# Patient Record
Sex: MALE
[De-identification: names, ages, dates, MRNs, and addresses within clinical notes are randomized per-mention and may not be internally consistent; named-entity substitution may affect disease eponyms.]

---

## 2023-02-13 ENCOUNTER — NON-APPOINTMENT (OUTPATIENT)
Age: 38
End: 2023-02-13

## 2023-03-06 ENCOUNTER — APPOINTMENT (OUTPATIENT)
Dept: SURGERY | Facility: CLINIC | Age: 38
End: 2023-03-06
Payer: MEDICAID

## 2023-03-06 VITALS
SYSTOLIC BLOOD PRESSURE: 125 MMHG | WEIGHT: 176.37 LBS | BODY MASS INDEX: 27.04 KG/M2 | HEART RATE: 72 BPM | DIASTOLIC BLOOD PRESSURE: 75 MMHG | HEIGHT: 67.72 IN

## 2023-03-06 DIAGNOSIS — Z82.49 FAMILY HISTORY OF ISCHEMIC HEART DISEASE AND OTHER DISEASES OF THE CIRCULATORY SYSTEM: ICD-10-CM

## 2023-03-06 DIAGNOSIS — F12.90 CANNABIS USE, UNSPECIFIED, UNCOMPLICATED: ICD-10-CM

## 2023-03-06 DIAGNOSIS — Z83.49 FAMILY HISTORY OF OTHER ENDOCRINE, NUTRITIONAL AND METABOLIC DISEASES: ICD-10-CM

## 2023-03-06 DIAGNOSIS — T14.8XXA OTHER INJURY OF UNSPECIFIED BODY REGION, INITIAL ENCOUNTER: ICD-10-CM

## 2023-03-06 PROBLEM — Z00.00 ENCOUNTER FOR PREVENTIVE HEALTH EXAMINATION: Status: ACTIVE | Noted: 2023-03-06

## 2023-03-06 PROCEDURE — 99203 OFFICE O/P NEW LOW 30 MIN: CPT

## 2023-03-09 PROBLEM — Z83.49 FAMILY HISTORY OF THYROID DISEASE: Status: ACTIVE | Noted: 2023-03-06

## 2023-03-09 PROBLEM — F12.90 OCCASIONAL USE OF MARIJUANA: Status: ACTIVE | Noted: 2023-03-06

## 2023-03-09 PROBLEM — Z82.49 FAMILY HISTORY OF CARDIAC DISORDER: Status: ACTIVE | Noted: 2023-03-06

## 2023-03-09 RX ORDER — FLUTICASONE PROPIONATE 50 UG/1
50 SPRAY, METERED NASAL
Qty: 16 | Refills: 0 | Status: ACTIVE | COMMUNITY
Start: 2023-03-06

## 2023-03-09 RX ORDER — BENZONATATE 100 MG/1
100 CAPSULE ORAL
Qty: 30 | Refills: 0 | Status: ACTIVE | COMMUNITY
Start: 2023-03-06

## 2023-03-14 PROBLEM — T14.8XXA FOREIGN MATERIAL: Status: ACTIVE | Noted: 2023-03-14

## 2023-03-14 NOTE — PLAN
[FreeTextEntry1] : Mr. CRAVEN  was told significance of findings, options, risks and benefits were explained.  Informed consent for wound exploration  and removal of foreign body and potential risks, benefits and alternatives (surgical options were discussed including non-surgical options or the option of no surgery) to the planned surgery were discussed in depth.  All surgical options were discussed including non-surgical treatments.  He wishes to proceed with surgery.  We will plan for surgery on at the next available date, pending any required insurance pre-certification or pre-approval. He agrees to obtain any necessary pre-operative evaluations and testing prior to surgery.\par Patient advised to seek immediate medical attention with any acute change in symptoms or with the development of any new or worsening symptoms.  Patient's questions and concerns addressed to patient's satisfaction, and patient verbalized an understanding of the information discussed.\par \par

## 2023-03-14 NOTE — PHYSICAL EXAM
[Abdominal Masses] : No abdominal masses [Abdomen Tenderness] : ~T ~M No abdominal tenderness [Alert] : alert [Oriented to Person] : oriented to person [Oriented to Place] : oriented to place [Oriented to Time] : oriented to time [Calm] : calm [de-identified] : He  is alert, well-groomed, and in NAD\par   [de-identified] : anicteric.  Nasal mucosa pink, septum midline. Oral mucosa pink.  Tongue midline, Pharynx without exudates.\par   [de-identified] : Neck supple. Trachea midline. Thyroid isthmus barely palpable, lobes not felt.\par   [de-identified] : RLQ scar

## 2023-03-14 NOTE — CONSULT LETTER
[Dear  ___] : Dear  [unfilled], [Consult Letter:] : I had the pleasure of evaluating your patient, [unfilled]. [Please see my note below.] : Please see my note below. [Consult Closing:] : Thank you very much for allowing me to participate in the care of this patient.  If you have any questions, please do not hesitate to contact me. [Sincerely,] : Sincerely, [FreeTextEntry3] : Yung Gonzalez MD, FACS

## 2023-03-14 NOTE — HISTORY OF PRESENT ILLNESS
[de-identified] : Mr. HOWARD CRAVEN  is  a 37 year   old patient  who was referred by Dr. Shelley Gilbert with the chief complaint of having pain  in his  right  lower quadrant. He has had the condition for 2 years .  Mr. CRAVEN is reporting  history of  appendectomy. He denies any fever or  night sweats.  Appetite is good and weight is stable. \par

## 2023-03-30 ENCOUNTER — TRANSCRIPTION ENCOUNTER (OUTPATIENT)
Age: 38
End: 2023-03-30

## 2023-03-31 ENCOUNTER — APPOINTMENT (OUTPATIENT)
Dept: SURGERY | Facility: HOSPITAL | Age: 38
End: 2023-03-31

## 2023-03-31 ENCOUNTER — TRANSCRIPTION ENCOUNTER (OUTPATIENT)
Age: 38
End: 2023-03-31

## 2023-03-31 ENCOUNTER — OUTPATIENT (OUTPATIENT)
Dept: OUTPATIENT SERVICES | Facility: HOSPITAL | Age: 38
LOS: 1 days | End: 2023-03-31
Payer: MEDICAID

## 2023-03-31 ENCOUNTER — RESULT REVIEW (OUTPATIENT)
Age: 38
End: 2023-03-31

## 2023-03-31 VITALS
TEMPERATURE: 98 F | DIASTOLIC BLOOD PRESSURE: 68 MMHG | RESPIRATION RATE: 18 BRPM | OXYGEN SATURATION: 100 % | SYSTOLIC BLOOD PRESSURE: 113 MMHG | HEART RATE: 66 BPM

## 2023-03-31 VITALS
HEART RATE: 70 BPM | DIASTOLIC BLOOD PRESSURE: 79 MMHG | OXYGEN SATURATION: 99 % | TEMPERATURE: 98 F | WEIGHT: 176.37 LBS | RESPIRATION RATE: 18 BRPM | HEIGHT: 67 IN | SYSTOLIC BLOOD PRESSURE: 123 MMHG

## 2023-03-31 DIAGNOSIS — R10.31 RIGHT LOWER QUADRANT PAIN: ICD-10-CM

## 2023-03-31 DIAGNOSIS — Z98.890 OTHER SPECIFIED POSTPROCEDURAL STATES: Chronic | ICD-10-CM

## 2023-03-31 DIAGNOSIS — Z90.49 ACQUIRED ABSENCE OF OTHER SPECIFIED PARTS OF DIGESTIVE TRACT: Chronic | ICD-10-CM

## 2023-03-31 PROCEDURE — 10120 INC&RMVL FB SUBQ TISS SMPL: CPT

## 2023-03-31 PROCEDURE — 20520 RMVL FB MUSC/TDN SIMPLE: CPT

## 2023-03-31 PROCEDURE — 88305 TISSUE EXAM BY PATHOLOGIST: CPT

## 2023-03-31 PROCEDURE — 88305 TISSUE EXAM BY PATHOLOGIST: CPT | Mod: 26

## 2023-03-31 PROCEDURE — 88304 TISSUE EXAM BY PATHOLOGIST: CPT

## 2023-03-31 PROCEDURE — 88304 TISSUE EXAM BY PATHOLOGIST: CPT | Mod: 26

## 2023-03-31 PROCEDURE — ZZZZZ: CPT

## 2023-03-31 RX ORDER — SODIUM CHLORIDE 9 MG/ML
3 INJECTION INTRAMUSCULAR; INTRAVENOUS; SUBCUTANEOUS EVERY 8 HOURS
Refills: 0 | Status: DISCONTINUED | OUTPATIENT
Start: 2023-03-31 | End: 2023-03-31

## 2023-03-31 RX ORDER — SODIUM CHLORIDE 9 MG/ML
1000 INJECTION, SOLUTION INTRAVENOUS
Refills: 0 | Status: DISCONTINUED | OUTPATIENT
Start: 2023-03-31 | End: 2023-04-14

## 2023-03-31 NOTE — H&P PST ADULT - HISTORY OF PRESENT ILLNESS
36 y/o Male w/ no sig PMHx, PSHx of Appendectomy, Varicose Vein presented for elective removal of foreign body; Pt states ever since after appendectomy (2yrs ago) he has been experiencing draining from right lower quadrant wound; denies abdominal pain, no fever, chills, SOB or CP. Offers no other complaints.

## 2023-03-31 NOTE — ASU DISCHARGE PLAN (ADULT/PEDIATRIC) - ASU DC SPECIAL INSTRUCTIONSFT
Follow up with Dr. Gonzalez in 1 week in office    You may take Motrin 600mg-800mg (with food) every 6 hours or Tylenol 650mg-1000mg every 6 hours as needed for mild pain. Stagger one medication 3 hours after the other for maximum pain control. Maximum daily dose of Tylenol should not exceed 4000mg/day

## 2023-03-31 NOTE — H&P PST ADULT - ASSESSMENT
38 y/o Male w/ right lower quadrant wound here for wound exploration, removal of foreign body     -OR today   -NPO  -IVF   -Pre op labs    -Consent to be obtained   -DVT ppx

## 2023-03-31 NOTE — BRIEF OPERATIVE NOTE - NSICDXBRIEFPROCEDURE_GEN_ALL_CORE_FT
PROCEDURES:  Simple removal, foreign body 31-Mar-2023 14:37:51  Staecy Gao  Exploration and closure, wound, abdomen 31-Mar-2023 19:14:19  Stacey Gao

## 2023-03-31 NOTE — ASU DISCHARGE PLAN (ADULT/PEDIATRIC) - CARE PROVIDER_API CALL
Pt with dog bite. Dog's immunization UTD. Plan for Tetanus, abx, strict return precautions given.
Yung Gonzalez)  Surgery  [address] [facility], [place] [address]  Phone: [phone]  Fax: [phone]  Follow Up Time:

## 2023-04-03 PROBLEM — Z78.9 OTHER SPECIFIED HEALTH STATUS: Chronic | Status: ACTIVE | Noted: 2023-03-31

## 2023-04-06 LAB — SURGICAL PATHOLOGY STUDY: SIGNIFICANT CHANGE UP

## 2023-04-13 ENCOUNTER — APPOINTMENT (OUTPATIENT)
Dept: SURGERY | Facility: CLINIC | Age: 38
End: 2023-04-13
Payer: MEDICAID

## 2023-04-13 PROCEDURE — ZZZZZ: CPT

## 2023-09-20 LAB — SARS-COV-2 N GENE NPH QL NAA+PROBE: NOT DETECTED

## (undated) DEVICE — NDL SPINAL 18G X 3.5" (PINK)

## (undated) DEVICE — WARMING BLANKET FULL ADULT

## (undated) DEVICE — SOL IRR POUR NS 0.9% 1500ML

## (undated) DEVICE — VENODYNE/SCD SLEEVE CALF MEDIUM

## (undated) DEVICE — FOR-ESU VALLEYLAB T7E15008DX: Type: DURABLE MEDICAL EQUIPMENT

## (undated) DEVICE — DRAPE LAPAROTOMY W VELCRO CORD TABS

## (undated) DEVICE — GLV 7.5 PROTEXIS (WHITE)

## (undated) DEVICE — SUT VICRYL PLUS 2-0 27" SH

## (undated) DEVICE — PACK MINOR NO DRAPE

## (undated) DEVICE — ELCTR GROUNDING PAD ADULT COVIDIEN

## (undated) DEVICE — SUT VICRYL PLUS 4-0 27" PS-2 UNDYED

## (undated) DEVICE — DRAPE C ARM UNIVERSAL

## (undated) DEVICE — SUT MONOSOF 3-0 18" P-12

## (undated) DEVICE — DRSG MASTISOL

## (undated) DEVICE — DRSG CURITY GAUZE SPONGE 4 X 4" 12-PLY

## (undated) DEVICE — DRAPE LIGHT HANDLE COVER (BLUE)

## (undated) DEVICE — NDL HYPO SAFE 18G X 1.5" (PINK)

## (undated) DEVICE — SUT VICRYL PLUS 3-0 27" SH

## (undated) DEVICE — GLV 7 PROTEXIS (WHITE)

## (undated) DEVICE — DRSG ADAPTIC 3 X 8"

## (undated) DEVICE — DRSG TEGADERM 4X4.75"